# Patient Record
Sex: FEMALE | Race: WHITE | NOT HISPANIC OR LATINO | Employment: UNEMPLOYED | URBAN - METROPOLITAN AREA
[De-identification: names, ages, dates, MRNs, and addresses within clinical notes are randomized per-mention and may not be internally consistent; named-entity substitution may affect disease eponyms.]

---

## 2020-03-12 ENCOUNTER — TRANSFERRED RECORDS (OUTPATIENT)
Dept: HEALTH INFORMATION MANAGEMENT | Facility: CLINIC | Age: 20
End: 2020-03-12

## 2020-03-17 ENCOUNTER — TRANSFERRED RECORDS (OUTPATIENT)
Dept: HEALTH INFORMATION MANAGEMENT | Facility: CLINIC | Age: 20
End: 2020-03-17

## 2020-03-18 ENCOUNTER — TRANSCRIBE ORDERS (OUTPATIENT)
Dept: OTHER | Age: 20
End: 2020-03-18

## 2020-03-18 ENCOUNTER — MEDICAL CORRESPONDENCE (OUTPATIENT)
Dept: HEALTH INFORMATION MANAGEMENT | Facility: CLINIC | Age: 20
End: 2020-03-18

## 2020-03-18 DIAGNOSIS — G47.33 OSA (OBSTRUCTIVE SLEEP APNEA): Primary | ICD-10-CM

## 2020-03-20 NOTE — TELEPHONE ENCOUNTER
FUTURE VISIT INFORMATION      FUTURE VISIT INFORMATION:    Date: 6/2/20    Time: 2 PM    Location: [facility]  REFERRAL INFORMATION:    Referring provider:  Dr. Bob Luna    Referring providers clinic:  [facility] - Pulmonology    Reason for visit/diagnosis: Consult for [employer]    RECORDS REQUESTED FROM:       Clinic name Comments Records Status Imaging Status   [facility] 3/12/20 - PULM OV with Dr. Luna  9/24/18 - SLEEP OV with Dr. Rivera Care Everywhere    [facility]- Procedure 11/3/16 - Sleep Study 3/20 Req                        * 3/20/20 9:45 AM Faxed req to [facility] for sleep study graphs/tracings - Lillian  3/24/2020 8:54AM [facility] sleep study received and sent to scan, closing encounter - Amay

## 2020-04-30 ENCOUNTER — TELEPHONE (OUTPATIENT)
Dept: OTOLARYNGOLOGY | Facility: CLINIC | Age: 20
End: 2020-04-30

## 2020-04-30 NOTE — TELEPHONE ENCOUNTER
Called patient and left a VM.     Offered a sooner virtual/telephone visit with Dr. Moraes.      Patient is welcome to reschedule to any open NEW slot as a virtual or phone visit if they would like to reschedule appointment to sooner date.      If patient would like to do sooner video visit, please confirm email and let patient know that a clinic staff member will reach out 2 days prior to help set up appointment. If patient would prefer phone visit please confirm best phone number for patient.

## 2020-06-02 ENCOUNTER — PRE VISIT (OUTPATIENT)
Dept: OTOLARYNGOLOGY | Facility: CLINIC | Age: 20
End: 2020-06-02

## 2020-06-29 ENCOUNTER — TELEPHONE (OUTPATIENT)
Dept: OTOLARYNGOLOGY | Facility: CLINIC | Age: 20
End: 2020-06-29

## 2020-06-29 NOTE — TELEPHONE ENCOUNTER
LVM letting pt know due to COVID-19 protocol Dr. Moraes is limited in patients that she can see in clinic at this time. Offered return video visit via [employer] for appt on 7/14 with provider.     Left call center number for conversion/rescheduling.     If pt would like to complete video visit please confirm pt e-mail. Please send tip sheet via [employer]. Pt is welcome to do telephone appt if unable to facilitate a video appt.

## 2020-10-30 ENCOUNTER — OFFICE VISIT (OUTPATIENT)
Dept: OTOLARYNGOLOGY | Facility: CLINIC | Age: 20
End: 2020-10-30
Payer: MEDICAID

## 2020-10-30 VITALS — OXYGEN SATURATION: 99 % | HEART RATE: 102 BPM | TEMPERATURE: 98.3 F | HEIGHT: 59 IN

## 2020-10-30 DIAGNOSIS — G47.33 OSA (OBSTRUCTIVE SLEEP APNEA): Primary | ICD-10-CM

## 2020-10-30 PROCEDURE — 99203 OFFICE O/P NEW LOW 30 MIN: CPT | Performed by: OTOLARYNGOLOGY

## 2020-10-30 RX ORDER — METFORMIN HYDROCHLORIDE 500 MG/5ML
SOLUTION ORAL
COMMUNITY

## 2020-10-30 RX ORDER — LEVOTHYROXINE SODIUM 200 UG/1
200 TABLET ORAL DAILY
COMMUNITY

## 2020-10-30 RX ORDER — BUDESONIDE 0.5 MG/2ML
0.5 INHALANT ORAL
COMMUNITY
Start: 2020-09-09 | End: 2021-09-14

## 2020-10-30 RX ORDER — LEVOTHYROXINE SODIUM 175 UG/1
175 TABLET ORAL DAILY
COMMUNITY

## 2020-10-30 RX ORDER — MONTELUKAST SODIUM 10 MG/1
TABLET ORAL
COMMUNITY
Start: 2020-01-22

## 2020-10-30 RX ORDER — FLUTICASONE PROPIONATE 50 MCG
2 SPRAY, SUSPENSION (ML) NASAL
COMMUNITY
Start: 2020-06-10

## 2020-10-30 ASSESSMENT — PAIN SCALES - GENERAL: PAINLEVEL: NO PAIN (0)

## 2020-10-30 NOTE — PROGRESS NOTES
SLEEP SURGERY CONSULTATION    Patient: Amie Kim  : 2000  CHIEF COMPLAINT: BENITO    HPI:  Amie Kim is a 19 year old year old female who has Obstructive Sleep Apnea.  Patient had a sleep study performed on 2016.  Her BMI at the time was 52.  She had 65 obstructive apneas, 2 mixed apneas, 26 central apneas, 358 hypopneas.  Overall AHI was 66.7.  Lowest oxygen saturation was 72%.  Patient has been trying CPAP for about 15 years now.  Their parents biggest issue is that she consistently takes the mask off.  She does not tolerate having it on her face.  Mom reports it has been relatively traumatic to try to make her use CPAP.  They have also tried supplemental oxygen which she did not tolerate.  Currently patient is sleeping in a more upright position which seems to be a better position for her overall.  Patient has had several oral pharyngeal surgeries including tonsillectomy and adenoidectomy as well as a soft palate procedure.  They do not believe any of these procedures have significantly improved her sleep apnea.      PAST MEDICAL HISTORY:  No past medical history on file.    PAST SURGICAL HISTORY:  No past surgical history on file.    MEDICATIONS:  Current Outpatient Medications   Medication Sig Dispense Refill     budesonide (PULMICORT) 0.5 MG/2ML neb solution Inhale 0.5 mg into the lungs       cholecalciferol 25 MCG (1000 UT) CHEW Take 2,000 Units by mouth       fluticasone (FLONASE) 50 MCG/ACT nasal spray Spray 2 sprays in nostril       levothyroxine (SYNTHROID/LEVOTHROID) 175 MCG tablet Take 175 mcg by mouth daily Wednesday, Thursday, Saturday,        levothyroxine (SYNTHROID/LEVOTHROID) 200 MCG tablet Take 200 mcg by mouth daily Monday, ,        MELATONIN PO        metFORMIN (GLUCOPHAGE) 500 MG/5ML SOLN solution Take by mouth daily (with lunch)       montelukast (SINGULAIR) 10 MG tablet TAKE 1 TABLET AT BEDTIME         ALLERGIES:  Allergies  "  Allergen Reactions     Morphine Other (See Comments)     Shaking, flopping     Meningococcal Vaccines Rash     Adhesive Tape Other (See Comments)     Skin breakdown       SOCIAL HISTORY:  Social History     Socioeconomic History     Marital status: Single     Spouse name: Not on file     Number of children: Not on file     Years of education: Not on file     Highest education level: Not on file   Occupational History     Not on file   Social Needs     Financial resource strain: Not on file     Food insecurity     Worry: Not on file     Inability: Not on file     Transportation needs     Medical: Not on file     Non-medical: Not on file   Tobacco Use     Smoking status: Not on file   Substance and Sexual Activity     Alcohol use: Not on file     Drug use: Not on file     Sexual activity: Not on file   Lifestyle     Physical activity     Days per week: Not on file     Minutes per session: Not on file     Stress: Not on file   Relationships     Social connections     Talks on phone: Not on file     Gets together: Not on file     Attends Gnosticist service: Not on file     Active member of club or organization: Not on file     Attends meetings of clubs or organizations: Not on file     Relationship status: Not on file     Intimate partner violence     Fear of current or ex partner: Not on file     Emotionally abused: Not on file     Physically abused: Not on file     Forced sexual activity: Not on file   Other Topics Concern     Not on file   Social History Narrative     Not on file       FAMILY HISTORY:  No family history on file.    REVIEW OF SYSTEMS:  No flowsheet data found.      PHYSICAL EXAM  Pulse 102   Temp 98.3  F (36.8  C) (Temporal)   Ht 1.492 m (4' 10.75\")   SpO2 99%     Constitutional: healthy, alert and no distress  ENT:   MOUTH:   MMM 4, s/p tonsillectomy, s/p UPPP   NOSE: Septum midline.  Mucosa does appear slightly dry and there is nasal discharge present.    ASSESSMENT:  1.  Severe obstructive " sleep apnea,     PLAN:  I discussed with the parents that at this time there is no good surgical options for the patient.  She is already had a number of oral pharyngeal surgeries.  The only option that would be left for Amie would be to consider hypoglossal nerve stimulation therapy.  However at this time there are a few limitations including the patient's age, and her BMI, as well as the severity of her sleep apnea.  At this time I would recommend that they continue trying to have her sleep in a reclined position or more head up position.  I also noted that she had a fair amount of dryness and mucus in her nose and she may benefit from some nasal saline spray to help improve her nasal breathing.  The age limit for hypoglossal nerve stimulation therapy is 22 and would recommend that the consider coming back to clinic for reevaluation at that time.    I spent 30 minutes face-to-face with Amie Kim during today's office visit, of which more than 50% was spent on counseling and coordination of care, which included discussion of pathophysiology of patient's obstructive sleep apnea, treatment options, risks and benefits of each option.

## 2020-10-30 NOTE — NURSING NOTE
"Chief Complaint   Patient presents with     Consult     Discuss surgical options       Pulse 102, temperature 98.3  F (36.8  C), temperature source Temporal, height 1.492 m (4' 10.75\"), SpO2 99 %.    Denise Willis, EMT  "

## 2020-10-30 NOTE — LETTER
10/30/2020       RE: Amie Kim  [address] [place] [id number]     Dear Colleague,    Thank you for referring your patient, Amie Kim, to the [facility] [place] at [facility]. Please see a copy of my visit note below.        SLEEP SURGERY CONSULTATION    Patient: Amie Kim  : 2000  CHIEF COMPLAINT: BENITO    HPI:  Amie Kim is a 19 year old year old female who has Obstructive Sleep Apnea.  Patient had a sleep study performed on 2016.  Her BMI at the time was 52.  She had 65 obstructive apneas, 2 mixed apneas, 26 central apneas, 358 hypopneas.  Overall AHI was 66.7.  Lowest oxygen saturation was 72%.  Patient has been trying CPAP for about 15 years now.  Their parents biggest issue is that she consistently takes the mask off.  She does not tolerate having it on her face.  Mom reports it has been relatively traumatic to try to make her use CPAP.  They have also tried supplemental oxygen which she did not tolerate.  Currently patient is sleeping in a more upright position which seems to be a better position for her overall.  Patient has had several oral pharyngeal surgeries including tonsillectomy and adenoidectomy as well as a soft palate procedure.  They do not believe any of these procedures have significantly improved her sleep apnea.      PAST MEDICAL HISTORY:  No past medical history on file.    PAST SURGICAL HISTORY:  No past surgical history on file.    MEDICATIONS:  Current Outpatient Medications   Medication Sig Dispense Refill     budesonide (PULMICORT) 0.5 MG/2ML neb solution Inhale 0.5 mg into the lungs       cholecalciferol 25 MCG (1000 UT) CHEW Take 2,000 Units by mouth       fluticasone (FLONASE) 50 MCG/ACT nasal spray Spray 2 sprays in nostril       levothyroxine (SYNTHROID/LEVOTHROID) 175 MCG tablet Take 175 mcg by mouth daily Wednesday, Thursday, Saturday,         levothyroxine (SYNTHROID/LEVOTHROID) 200 MCG tablet Take 200 mcg by mouth daily Monday, Tuesdays, Fridays       MELATONIN PO        metFORMIN (GLUCOPHAGE) 500 MG/5ML SOLN solution Take by mouth daily (with lunch)       montelukast (SINGULAIR) 10 MG tablet TAKE 1 TABLET AT BEDTIME         ALLERGIES:  Allergies   Allergen Reactions     Morphine Other (See Comments)     Shaking, flopping     Meningococcal Vaccines Rash     Adhesive Tape Other (See Comments)     Skin breakdown       SOCIAL HISTORY:  Social History     Socioeconomic History     Marital status: Single     Spouse name: Not on file     Number of children: Not on file     Years of education: Not on file     Highest education level: Not on file   Occupational History     Not on file   Social Needs     Financial resource strain: Not on file     Food insecurity     Worry: Not on file     Inability: Not on file     Transportation needs     Medical: Not on file     Non-medical: Not on file   Tobacco Use     Smoking status: Not on file   Substance and Sexual Activity     Alcohol use: Not on file     Drug use: Not on file     Sexual activity: Not on file   Lifestyle     Physical activity     Days per week: Not on file     Minutes per session: Not on file     Stress: Not on file   Relationships     Social connections     Talks on phone: Not on file     Gets together: Not on file     Attends Sikh service: Not on file     Active member of club or organization: Not on file     Attends meetings of clubs or organizations: Not on file     Relationship status: Not on file     Intimate partner violence     Fear of current or ex partner: Not on file     Emotionally abused: Not on file     Physically abused: Not on file     Forced sexual activity: Not on file   Other Topics Concern     Not on file   Social History Narrative     Not on file       FAMILY HISTORY:  No family history on file.    REVIEW OF SYSTEMS:  No flowsheet data found.      PHYSICAL EXAM  Pulse 102    "Temp 98.3  F (36.8  C) (Temporal)   Ht 1.492 m (4' 10.75\")   SpO2 99%     Constitutional: healthy, alert and no distress  ENT:   MOUTH:   MMM 4, s/p tonsillectomy, s/p UPPP   NOSE: Septum midline.  Mucosa does appear slightly dry and there is nasal discharge present.    ASSESSMENT:  1.  Severe obstructive sleep apnea,     PLAN:  I discussed with the parents that at this time there is no good surgical options for the patient.  She is already had a number of oral pharyngeal surgeries.  The only option that would be left for Amie would be to consider hypoglossal nerve stimulation therapy.  However at this time there are a few limitations including the patient's age, and her BMI, as well as the severity of her sleep apnea.  At this time I would recommend that they continue trying to have her sleep in a reclined position or more head up position.  I also noted that she had a fair amount of dryness and mucus in her nose and she may benefit from some nasal saline spray to help improve her nasal breathing.  The age limit for hypoglossal nerve stimulation therapy is 22 and would recommend that the consider coming back to clinic for reevaluation at that time.    I spent 30 minutes face-to-face with Amie Kim during today's office visit, of which more than 50% was spent on counseling and coordination of care, which included discussion of pathophysiology of patient's obstructive sleep apnea, treatment options, risks and benefits of each option.          Again, thank you for allowing me to participate in the care of your patient.      Sincerely,    Karyna Moraes MD      "